# Patient Record
Sex: FEMALE | URBAN - NONMETROPOLITAN AREA
[De-identification: names, ages, dates, MRNs, and addresses within clinical notes are randomized per-mention and may not be internally consistent; named-entity substitution may affect disease eponyms.]

---

## 2024-05-20 ENCOUNTER — NURSE TRIAGE (OUTPATIENT)
Dept: CALL CENTER | Facility: HOSPITAL | Age: 19
End: 2024-05-20

## 2024-05-20 NOTE — TELEPHONE ENCOUNTER
"Patient called report severe cramping, vaginal discharge. Was seen at [facility] recently, told she was pregnant and was to follow up with an OB/GYN. Uncertain as to how many weeks pregnant, has not had an ultrasound to confirm, date of last period 4/12/24. Symptoms started this evening while at work. Advised patient to be evaluated in ER, discussed choosing one with capability to perform an ultrasound. Verbalizes understanding.         Reason for Disposition   MODERATE-SEVERE abdominal pain (e.g., interferes with normal activities, awakens from sleep)    Additional Information   Negative: Passed out (i.e., lost consciousness, collapsed and was not responding)   Negative: Shock suspected (e.g., cold/pale/clammy skin, too weak to stand, low BP, rapid pulse)   Negative: Difficult to awaken or acting confused (e.g., disoriented, slurred speech)   Negative: Sounds like a life-threatening emergency to the triager   Negative: Followed an abdomen (stomach) injury   Negative: [1] Abdominal pain AND [2] pregnant 20 or more weeks    Answer Assessment - Initial Assessment Questions  1. LOCATION: \"Where does it hurt?\"       Lower abdominal  2. RADIATION: \"Does the pain shoot anywhere else?\" (e.g., chest, back, shoulder)      no  3. ONSET: \"When did the pain begin?\" (e.g., minutes, hours or days ago)       Earlier today   4. ONSET: \"Gradual or sudden onset?\"      Sudden   5. PATTERN \"Does the pain come and go, or has it been constant since it started?\"       constant  6. SEVERITY: \"How bad is the pain?\" \"What does it keep you from doing?\"  (e.g., Scale 1-10; mild, moderate, or severe)    - MILD (1-3): Doesn't interfere with normal activities, abdomen soft and not tender to touch.     - MODERATE (4-7): Interferes with normal activities or awakens from sleep, abdomen tender to touch.     - SEVERE (8-10): Excruciating pain, doubled over, unable to do any normal activities.      Severe - had to leave work   7. " "RECURRENT SYMPTOM: \"Have you ever had this type of stomach pain before?\" If Yes, ask: \"When was the last time?\" and \"What happened that time?\"       New onset  8. CAUSE: \"What do you think is causing the stomach pain?\"      Uncertain   9. RELIEVING/AGGRAVATING FACTORS: \"What makes it better or worse?\" (e.g., antacids, bowel movement, movement)      PELON  10. OTHER SYMPTOMS: \"Do you have any other symptoms?\" (e.g., back pain, diarrhea, fever, urination pain, vaginal bleeding, vaginal discharge, vomiting)        Vaginal discharge  11. GEOVANNY: \"What date are you expecting to deliver?\"        Last period 4/12/2024.    Protocols used: Pregnancy - Abdominal Pain Less Than 20 Weeks EGA-ADULT-AH    "